# Patient Record
Sex: FEMALE | Race: WHITE | URBAN - METROPOLITAN AREA
[De-identification: names, ages, dates, MRNs, and addresses within clinical notes are randomized per-mention and may not be internally consistent; named-entity substitution may affect disease eponyms.]

---

## 2021-08-28 ENCOUNTER — TELEPHONE ENCOUNTER (OUTPATIENT)
Dept: URBAN - METROPOLITAN AREA CLINIC 13 | Facility: CLINIC | Age: 66
End: 2021-08-28

## 2021-08-29 ENCOUNTER — TELEPHONE ENCOUNTER (OUTPATIENT)
Dept: URBAN - METROPOLITAN AREA CLINIC 13 | Facility: CLINIC | Age: 66
End: 2021-08-29

## 2024-05-23 PROBLEM — 266435005: Status: ACTIVE | Noted: 2024-05-23

## 2024-05-24 ENCOUNTER — LAB OUTSIDE AN ENCOUNTER (OUTPATIENT)
Dept: URBAN - METROPOLITAN AREA CLINIC 46 | Facility: CLINIC | Age: 69
End: 2024-05-24

## 2024-05-24 ENCOUNTER — DASHBOARD ENCOUNTERS (OUTPATIENT)
Age: 69
End: 2024-05-24

## 2024-05-24 ENCOUNTER — OFFICE VISIT (OUTPATIENT)
Dept: URBAN - METROPOLITAN AREA CLINIC 46 | Facility: CLINIC | Age: 69
End: 2024-05-24
Payer: MEDICARE

## 2024-05-24 VITALS
TEMPERATURE: 98.3 F | SYSTOLIC BLOOD PRESSURE: 152 MMHG | WEIGHT: 148.8 LBS | BODY MASS INDEX: 26.36 KG/M2 | DIASTOLIC BLOOD PRESSURE: 70 MMHG | HEART RATE: 83 BPM | HEIGHT: 63 IN | OXYGEN SATURATION: 97 %

## 2024-05-24 DIAGNOSIS — K21.9 GASTROESOPHAGEAL REFLUX DISEASE, UNSPECIFIED WHETHER ESOPHAGITIS PRESENT: ICD-10-CM

## 2024-05-24 PROBLEM — 235595009: Status: ACTIVE | Noted: 2024-05-24

## 2024-05-24 PROCEDURE — 99204 OFFICE O/P NEW MOD 45 MIN: CPT

## 2024-05-24 RX ORDER — ESOMEPRAZOLE MAGNESIUM 40 MG/1
1 CAPSULE CAPSULE, DELAYED RELEASE ORAL
Qty: 90 | Refills: 3 | OUTPATIENT
Start: 2024-05-24

## 2024-05-24 RX ORDER — LISINOPRIL 5 MG/1
1 TABLET TABLET ORAL ONCE A DAY
Qty: 30 | Status: ACTIVE | COMMUNITY
Start: 2024-05-24

## 2024-05-24 RX ORDER — OMEPRAZOLE 40 MG/1
1 CAPSULE 30 MINUTES BEFORE MORNING MEAL CAPSULE, DELAYED RELEASE ORAL ONCE A DAY
Refills: 0 | Status: ACTIVE | COMMUNITY

## 2024-05-24 RX ORDER — FAMOTIDINE 20 MG/1
1 TABLET AT BEDTIME AS NEEDED TABLET, FILM COATED ORAL ONCE A DAY
Qty: 90 TABLET | Refills: 3 | OUTPATIENT
Start: 2024-05-24

## 2024-07-11 ENCOUNTER — TELEPHONE ENCOUNTER (OUTPATIENT)
Dept: URBAN - METROPOLITAN AREA CLINIC 46 | Facility: CLINIC | Age: 69
End: 2024-07-11

## 2024-07-11 RX ORDER — SOD SULF/POT CHLORIDE/MAG SULF 1.479 G
12 TABLETS THE FIRST DOSE THE EVENING BEFORE AND SECOND DOSE THE MORNING OF COLONOSCOPY TABLET ORAL TWICE A DAY
Qty: 24 | Refills: 0 | OUTPATIENT
Start: 2024-07-12 | End: 2024-07-13

## 2024-07-12 ENCOUNTER — LAB OUTSIDE AN ENCOUNTER (OUTPATIENT)
Dept: URBAN - METROPOLITAN AREA CLINIC 46 | Facility: CLINIC | Age: 69
End: 2024-07-12

## 2024-07-25 ENCOUNTER — TELEPHONE ENCOUNTER (OUTPATIENT)
Dept: URBAN - METROPOLITAN AREA CLINIC 46 | Facility: CLINIC | Age: 69
End: 2024-07-25

## 2024-08-23 ENCOUNTER — CLAIMS CREATED FROM THE CLAIM WINDOW (OUTPATIENT)
Dept: URBAN - METROPOLITAN AREA CLINIC 4 | Facility: CLINIC | Age: 69
End: 2024-08-23
Payer: MEDICARE

## 2024-08-23 ENCOUNTER — OFFICE VISIT (OUTPATIENT)
Dept: URBAN - METROPOLITAN AREA SURGERY CENTER 27 | Facility: SURGERY CENTER | Age: 69
End: 2024-08-23
Payer: MEDICARE

## 2024-08-23 DIAGNOSIS — K29.70 GASTRITIS, UNSPECIFIED, WITHOUT BLEEDING: ICD-10-CM

## 2024-08-23 DIAGNOSIS — K57.30 DIVERTICULOSIS OF LARGE INTESTINE WITHOUT PERFORATION OR ABSCESS WITHOUT BLEEDING: ICD-10-CM

## 2024-08-23 DIAGNOSIS — Z12.11 ENCOUNTER FOR SCREENING FOR MALIGNANT NEOPLASM OF COLON: ICD-10-CM

## 2024-08-23 DIAGNOSIS — K31.89 FOCAL FOVEOLAR HYPERPLASIA: ICD-10-CM

## 2024-08-23 DIAGNOSIS — K22.89 ESOPHAGEAL BLEEDING: ICD-10-CM

## 2024-08-23 DIAGNOSIS — K31.89 OTHER DISEASES OF STOMACH AND DUODENUM: ICD-10-CM

## 2024-08-23 DIAGNOSIS — R07.9 CHEST PAIN, UNSPECIFIED: ICD-10-CM

## 2024-08-23 DIAGNOSIS — D12.3 BENIGN NEOPLASM OF TRANSVERSE COLON: ICD-10-CM

## 2024-08-23 DIAGNOSIS — K21.9 GASTRO-ESOPHAGEAL REFLUX DISEASE WITHOUT ESOPHAGITIS: ICD-10-CM

## 2024-08-23 DIAGNOSIS — Z80.0 FAMILY HISTORY OF MALIGNANT NEOPLASM OF DIGESTIVE ORGANS: ICD-10-CM

## 2024-08-23 DIAGNOSIS — K22.89 OTHER SPECIFIED DISEASE OF ESOPHAGUS: ICD-10-CM

## 2024-08-23 DIAGNOSIS — K63.89 BOWEL DISEASE, INFLAMMATORY: ICD-10-CM

## 2024-08-23 DIAGNOSIS — K29.70 CHRONIC GASTRITIS: ICD-10-CM

## 2024-08-23 PROCEDURE — 88312 SPECIAL STAINS GROUP 1: CPT | Performed by: PATHOLOGY

## 2024-08-23 PROCEDURE — 45385 COLONOSCOPY W/LESION REMOVAL: CPT | Performed by: INTERNAL MEDICINE

## 2024-08-23 PROCEDURE — 88305 TISSUE EXAM BY PATHOLOGIST: CPT | Performed by: PATHOLOGY

## 2024-08-23 PROCEDURE — 88300 SURGICAL PATH GROSS: CPT | Performed by: PATHOLOGY

## 2024-08-23 PROCEDURE — 45380 COLONOSCOPY AND BIOPSY: CPT | Performed by: INTERNAL MEDICINE

## 2024-08-23 PROCEDURE — 00813 ANES UPR LWR GI NDSC PX: CPT | Performed by: NURSE ANESTHETIST, CERTIFIED REGISTERED

## 2024-08-23 PROCEDURE — 43239 EGD BIOPSY SINGLE/MULTIPLE: CPT | Performed by: INTERNAL MEDICINE

## 2024-08-23 RX ORDER — FAMOTIDINE 20 MG/1
1 TABLET AT BEDTIME AS NEEDED TABLET, FILM COATED ORAL ONCE A DAY
Qty: 90 TABLET | Refills: 3 | Status: ACTIVE | COMMUNITY
Start: 2024-05-24

## 2024-08-23 RX ORDER — ESOMEPRAZOLE MAGNESIUM 40 MG/1
1 CAPSULE CAPSULE, DELAYED RELEASE ORAL
Qty: 90 | Refills: 3 | Status: ACTIVE | COMMUNITY
Start: 2024-05-24

## 2024-08-23 RX ORDER — LISINOPRIL 5 MG/1
1 TABLET TABLET ORAL ONCE A DAY
Qty: 30 | Status: ACTIVE | COMMUNITY
Start: 2024-05-24

## 2024-08-23 RX ORDER — OMEPRAZOLE 40 MG/1
1 CAPSULE 30 MINUTES BEFORE MORNING MEAL CAPSULE, DELAYED RELEASE ORAL ONCE A DAY
Refills: 0 | Status: ACTIVE | COMMUNITY

## 2024-09-20 PROBLEM — 428283002: Status: ACTIVE | Noted: 2024-09-20

## 2024-09-23 ENCOUNTER — LAB OUTSIDE AN ENCOUNTER (OUTPATIENT)
Dept: URBAN - METROPOLITAN AREA CLINIC 48 | Facility: CLINIC | Age: 69
End: 2024-09-23

## 2024-09-23 ENCOUNTER — OFFICE VISIT (OUTPATIENT)
Dept: URBAN - METROPOLITAN AREA CLINIC 48 | Facility: CLINIC | Age: 69
End: 2024-09-23
Payer: MEDICARE

## 2024-09-23 VITALS
HEART RATE: 74 BPM | SYSTOLIC BLOOD PRESSURE: 156 MMHG | BODY MASS INDEX: 24.7 KG/M2 | TEMPERATURE: 97.5 F | WEIGHT: 139.4 LBS | HEIGHT: 63 IN | DIASTOLIC BLOOD PRESSURE: 76 MMHG

## 2024-09-23 DIAGNOSIS — R68.81 EARLY SATIETY: ICD-10-CM

## 2024-09-23 DIAGNOSIS — R63.4 UNINTENTIONAL WEIGHT LOSS: ICD-10-CM

## 2024-09-23 DIAGNOSIS — Z86.010 HX OF COLONIC POLYPS: ICD-10-CM

## 2024-09-23 DIAGNOSIS — K21.9 GASTROESOPHAGEAL REFLUX DISEASE, UNSPECIFIED WHETHER ESOPHAGITIS PRESENT: ICD-10-CM

## 2024-09-23 PROCEDURE — 99214 OFFICE O/P EST MOD 30 MIN: CPT

## 2024-09-23 RX ORDER — FAMOTIDINE 20 MG/1
1 TABLET AT BEDTIME AS NEEDED TABLET, FILM COATED ORAL ONCE A DAY
Qty: 90 TABLET | Refills: 3 | Status: ACTIVE | COMMUNITY
Start: 2024-05-24

## 2024-09-23 RX ORDER — ESOMEPRAZOLE MAGNESIUM 40 MG/1
1 CAPSULE CAPSULE, DELAYED RELEASE ORAL
Qty: 90 | Refills: 3 | Status: ACTIVE | COMMUNITY
Start: 2024-05-24

## 2024-09-23 RX ORDER — AMLODIPINE BESYLATE 2.5 MG/1
1 TABLET TABLET ORAL ONCE A DAY
Qty: 90 TABLET | Status: ACTIVE | COMMUNITY

## 2024-09-23 NOTE — HPI-TODAY'S VISIT:
69-year-old female presents for follow-up.  Last OV 5/20/2024 for chest pain. Patient was referred to cardiology to rule out ACS, with negative stress test. She stated she has had GERD since her 20s, which was previously controlled on omeprazole 40 mg daily. At last visit, she notes breakthrough symptoms of heartburn, belching which were worse at night; was taking OTC antacids at night for this. She tried to cut out heavy/fatty foods with some improvement. She claimed anxiety, life stressors may be playing a role in recent exacerbation of sx. For reflux, rx'd esomeprazole 40 mg daily, Pepcid 20 mg at night. EGD 8/23/2024 with congested, white-specked mucosa in esophagus, gastritis characterized by congestion and erosions, congested duodenal mucosa.  On path, no evidence of H. pylori or celiac. Currently, she states she has lost approx 15 lbs in the past 3-4 months due to decreased appetite and early satiety. She has experienced nonbloody emesis twice in the last month after overeating. Reflux is well-controlled on esomeprazole 40mg daily, and she is inquiring about weaning off PPI due to concerns regarding long-term AEs. She is not diabetic, and is not taking any narcotics or GLP-1 medications.  Colonoscopy 8/23/2024 with diverticulosis, removal of 2 polyps.  No path results available. Recommended repeat colon in 5 years. Her mother had CRC, and she is unsure when she was dx'd.

## 2024-11-19 PROBLEM — 235675006: Status: ACTIVE | Noted: 2024-11-19

## 2024-11-20 ENCOUNTER — OFFICE VISIT (OUTPATIENT)
Dept: URBAN - METROPOLITAN AREA CLINIC 48 | Facility: CLINIC | Age: 69
End: 2024-11-20
Payer: MEDICARE

## 2024-11-20 VITALS
HEIGHT: 63 IN | HEART RATE: 77 BPM | TEMPERATURE: 97.9 F | BODY MASS INDEX: 25.09 KG/M2 | SYSTOLIC BLOOD PRESSURE: 134 MMHG | DIASTOLIC BLOOD PRESSURE: 78 MMHG | WEIGHT: 141.6 LBS

## 2024-11-20 DIAGNOSIS — R07.89 ATYPICAL CHEST PAIN: ICD-10-CM

## 2024-11-20 DIAGNOSIS — Z80.0 FAMILY HX OF COLON CANCER: ICD-10-CM

## 2024-11-20 DIAGNOSIS — K31.84 GASTROPARESIS: ICD-10-CM

## 2024-11-20 DIAGNOSIS — Z86.0100 PERSONAL HISTORY OF COLON POLYPS, UNSPECIFIED: ICD-10-CM

## 2024-11-20 DIAGNOSIS — K21.9 GASTROESOPHAGEAL REFLUX DISEASE, UNSPECIFIED WHETHER ESOPHAGITIS PRESENT: ICD-10-CM

## 2024-11-20 PROCEDURE — 99214 OFFICE O/P EST MOD 30 MIN: CPT

## 2024-11-20 RX ORDER — METOCLOPRAMIDE 5 MG/1
1 TABLET BEFORE MEALS TABLET ORAL TWICE A DAY
Qty: 60 | Refills: 3 | OUTPATIENT
Start: 2024-11-20

## 2024-11-20 RX ORDER — FAMOTIDINE 20 MG/1
1 TABLET AT BEDTIME AS NEEDED TABLET, FILM COATED ORAL ONCE A DAY
Qty: 90 TABLET | Refills: 3 | Status: ACTIVE | COMMUNITY
Start: 2024-05-24

## 2024-11-20 RX ORDER — AMLODIPINE BESYLATE 2.5 MG/1
1 TABLET TABLET ORAL ONCE A DAY
Qty: 90 TABLET | Status: ACTIVE | COMMUNITY

## 2024-11-20 RX ORDER — ESOMEPRAZOLE MAGNESIUM 40 MG/1
1 CAPSULE CAPSULE, DELAYED RELEASE ORAL
Qty: 90 | Refills: 3 | Status: ACTIVE | COMMUNITY
Start: 2024-05-24

## 2024-11-20 NOTE — HPI-TODAY'S VISIT:
69-year-old female presents for follow-up.  Last OV 9/23/2024. She was previously seen for chest pain/pressure. Patient was referred to cardiology to rule out ACS, with negative stress test. She stated she has had GERD since her 20s, which was previously controlled on omeprazole 40 mg daily, but then she began to have breakthrough symptoms of heartburn, belching which were worse at night; was taking OTC antacids at night for this. She tried to cut out heavy/fatty foods with some improvement. Rx'd esomeprazole 40 mg daily, Pepcid 20 mg at night. EGD 8/23/2024 with congested, white-specked mucosa in esophagus, gastritis characterized by congestion and erosions, congested duodenal mucosa. On path, no evidence of H. pylori or celiac. At last OV, she reported losing approx 15 lbs in the past 3-4 months due to decreased appetite and early satiety. Also noted two episodes of nonbloody emesis after overeating. Reflux was well-controlled on esomeprazole 40mg daily, and she inquired about weaning off PPI due to concerns regarding long-term AEs. She is not diabetic, and is not taking any narcotics or GLP-1 medications. Gastric emptying studiy 10/2/2024 showed delayed emptying. Currently, she reports good control of reflux but still has some dyspepsia. Since implementing smaller, low-fat meals, her symptoms have improved. She denies further emesis, and has gained 1 lb back since last OV. She still notes nonexertional chest pain/pressure, which she attributes to anxiety, and is inquiring about referral for counseling/therapy.  Colonoscopy 8/23/2024 with diverticulosis, removal of 2 polyps.  No path results available. Recommended repeat colon in 5 years. Her mother had CRC, and she is unsure when she was dx'd.

## 2024-11-21 ENCOUNTER — OFFICE VISIT (OUTPATIENT)
Dept: URBAN - METROPOLITAN AREA CLINIC 48 | Facility: CLINIC | Age: 69
End: 2024-11-21

## 2025-03-20 ENCOUNTER — OFFICE VISIT (OUTPATIENT)
Dept: URBAN - METROPOLITAN AREA CLINIC 48 | Facility: CLINIC | Age: 70
End: 2025-03-20
Payer: COMMERCIAL

## 2025-03-20 VITALS
HEIGHT: 63 IN | DIASTOLIC BLOOD PRESSURE: 79 MMHG | BODY MASS INDEX: 25.94 KG/M2 | WEIGHT: 146.4 LBS | HEART RATE: 76 BPM | TEMPERATURE: 98.1 F | SYSTOLIC BLOOD PRESSURE: 125 MMHG

## 2025-03-20 DIAGNOSIS — R07.89 ATYPICAL CHEST PAIN: ICD-10-CM

## 2025-03-20 DIAGNOSIS — Z86.0100 PERSONAL HISTORY OF COLON POLYPS, UNSPECIFIED: ICD-10-CM

## 2025-03-20 DIAGNOSIS — K31.84 GASTROPARESIS: ICD-10-CM

## 2025-03-20 DIAGNOSIS — Z80.0 FAMILY HX OF COLON CANCER: ICD-10-CM

## 2025-03-20 DIAGNOSIS — K21.9 GASTROESOPHAGEAL REFLUX DISEASE, UNSPECIFIED WHETHER ESOPHAGITIS PRESENT: ICD-10-CM

## 2025-03-20 PROCEDURE — 99214 OFFICE O/P EST MOD 30 MIN: CPT

## 2025-03-20 RX ORDER — ESOMEPRAZOLE MAGNESIUM 40 MG/1
1 CAPSULE CAPSULE, DELAYED RELEASE ORAL
Qty: 90 | Refills: 3 | Status: ACTIVE | COMMUNITY
Start: 2024-05-24

## 2025-03-20 RX ORDER — FAMOTIDINE 40 MG/1
1 TABLET TABLET, FILM COATED ORAL TWICE A DAY
Qty: 60 | Refills: 3 | OUTPATIENT
Start: 2025-03-20

## 2025-03-20 RX ORDER — FAMOTIDINE 20 MG/1
1 TABLET AT BEDTIME AS NEEDED TABLET, FILM COATED ORAL ONCE A DAY
Qty: 90 TABLET | Refills: 3 | Status: ACTIVE | COMMUNITY
Start: 2024-05-24

## 2025-03-20 RX ORDER — AMLODIPINE BESYLATE 2.5 MG/1
1 TABLET TABLET ORAL ONCE A DAY
Qty: 90 TABLET | Status: ACTIVE | COMMUNITY

## 2025-03-20 NOTE — HPI-TODAY'S VISIT:
69-year-old female presents for follow-up.  Last OV 11/20/2024. She was previously seen for chest pain/pressure. Patient was referred to cardiology to rule out ACS, with negative stress test. She stated she has had GERD since her 20's, which was previously controlled on omeprazole 40 mg daily, but then she began to have breakthrough symptoms of heartburn, belching which were worse at night; was taking OTC antacids at night for this. She tried to cut out heavy/fatty foods with some improvement. Rx'd esomeprazole 40 mg daily, Pepcid 20 mg at night. EGD 8/23/2024 with congested, white-specked mucosa in esophagus, gastritis characterized by congestion and erosions, congested duodenal mucosa. On path, no evidence of H. pylori or celiac. At f/u visit, she reported losing approx 15 lbs in the past 3-4 months due to decreased appetite and early satiety. Also noted two episodes of nonbloody emesis after overeating. She is not diabetic, and is not taking any narcotics or GLP-1 medications. GES 10/2/2024 showed delayed emptying. At last visit, patient reported good control of reflux but still endorsed some dyspepsia. Since implementing smaller, low-fat meals, her symptoms improved. She continued to note nonexertional chest pain/pressure, which she attributed to anxiety, and inquired about referral for counseling/therapy. Currently, patient continues on esomeprazole 40mg daily, and is only using famotidine at bedtime as needed. She would like to try and discontinue esomeprazole, as she feels it is causing memory issues. However, if she skips more than a few days of PPI, her reflux symptoms return. Reflux and dyspepsia continues to be well-controlled on PPI every day or every other day, and low-residue gastroparesis diet. Patient denies dysphagia, vomiting, abd pain.  Colonoscopy 8/23/2024 with diverticulosis, removal of 2 polyps. No path results available. Recommended repeat colon in 5 years. Her mother had CRC, and she is unsure when she was dx'd.

## 2025-05-22 ENCOUNTER — OFFICE VISIT (OUTPATIENT)
Dept: URBAN - METROPOLITAN AREA TELEHEALTH 2 | Facility: TELEHEALTH | Age: 70
End: 2025-05-22
Payer: COMMERCIAL

## 2025-05-22 DIAGNOSIS — R07.89 ATYPICAL CHEST PAIN: ICD-10-CM

## 2025-05-22 DIAGNOSIS — Z80.0 FAMILY HX OF COLON CANCER: ICD-10-CM

## 2025-05-22 DIAGNOSIS — Z86.0100 PERSONAL HISTORY OF COLON POLYPS, UNSPECIFIED: ICD-10-CM

## 2025-05-22 DIAGNOSIS — K21.9 GASTROESOPHAGEAL REFLUX DISEASE, UNSPECIFIED WHETHER ESOPHAGITIS PRESENT: ICD-10-CM

## 2025-05-22 DIAGNOSIS — K31.84 GASTROPARESIS: ICD-10-CM

## 2025-05-22 PROCEDURE — 99214 OFFICE O/P EST MOD 30 MIN: CPT | Performed by: INTERNAL MEDICINE

## 2025-05-22 RX ORDER — AMLODIPINE BESYLATE 2.5 MG/1
1 TABLET TABLET ORAL ONCE A DAY
Qty: 90 TABLET | Status: ACTIVE | COMMUNITY

## 2025-05-22 RX ORDER — FAMOTIDINE 40 MG/1
1 TABLET TABLET, FILM COATED ORAL TWICE A DAY
Qty: 60 | Refills: 3
Start: 2025-03-20

## 2025-05-22 RX ORDER — FAMOTIDINE 40 MG/1
1 TABLET TABLET, FILM COATED ORAL TWICE A DAY
Qty: 60 | Refills: 3 | Status: ACTIVE | COMMUNITY
Start: 2025-03-20

## 2025-05-22 NOTE — HPI-TODAY'S VISIT:
69-year-old female presents for follow-up.  Last OV 3/20/2025. She was previously seen for chest pain/pressure. Patient was referred to cardiology with negative stress test. She has history GERD since her 20's, which was previously controlled on omeprazole 40 mg daily, but then she began to have breakthrough symptoms of heartburn, belching which were worse at night; patient took OTC antacids prn. She cut out heavy/fatty foods with some improvement. Rx'd esomeprazole 40 mg daily, Pepcid 20 mg at night. EGD 8/23/2024 with congested, white-specked mucosa in esophagus, gastritis characterized by congestion and erosions, congested duodenal mucosa. On path, no evidence of H. pylori or celiac. At f/u visit, she reported losing approx 15 lbs in 3-4 months due to decreased appetite and early satiety. Also noted two episodes of nonbloody emesis after overeating. She is not diabetic, and is not taking any narcotics or GLP-1 medications. GES 10/2/2024 showed delayed emptying. At f/u visit, patient reported good control of reflux but still endorsed some dyspepsia. Since implementing smaller, low-fat meals, her symptoms improved. She continued to note nonexertional chest pain/pressure, which she attributed to anxiety, and inquired about referral for counseling/therapy. Patient was interested in weaning off esomeprazole, as she felt it caused memory issues. Reflux and dyspepsia was well-controlled on PPI every day or every other day and low-residue gastroparesis diet. At last visit, advised to switch to famotidine 40mg BID. Currently, patient continues on esomeprazole 40mg daily with stable reflux and gastroparesis symptoms. She never tried BID famotidine regimen. Patient denies dysphagia, vomiting. Weight is stable.  Colonoscopy 8/23/2024 with diverticulosis, removal of 2 polyps. No path results available. Recommended repeat colon in 5 years. Her mother had CRC, and she is unsure when she was dx'd.

## 2025-06-09 ENCOUNTER — TELEPHONE ENCOUNTER (OUTPATIENT)
Dept: URBAN - METROPOLITAN AREA CLINIC 48 | Facility: CLINIC | Age: 70
End: 2025-06-09

## 2025-06-09 RX ORDER — ESOMEPRAZOLE MAGNESIUM 40 MG/1
1 CAPSULE CAPSULE, DELAYED RELEASE ORAL
Qty: 90 | Refills: 3
Start: 2024-05-24